# Patient Record
Sex: FEMALE | URBAN - METROPOLITAN AREA
[De-identification: names, ages, dates, MRNs, and addresses within clinical notes are randomized per-mention and may not be internally consistent; named-entity substitution may affect disease eponyms.]

---

## 2021-01-05 ENCOUNTER — APPOINTMENT (OUTPATIENT)
Age: 56
Setting detail: DERMATOLOGY
End: 2021-01-05

## 2021-01-05 DIAGNOSIS — L20.89 OTHER ATOPIC DERMATITIS: ICD-10-CM

## 2021-01-05 PROCEDURE — OTHER COUNSELING: OTHER

## 2021-01-05 PROCEDURE — OTHER TREATMENT REGIMEN: OTHER

## 2021-01-05 PROCEDURE — OTHER ADDITIONAL NOTES: OTHER

## 2021-01-05 PROCEDURE — 99203 OFFICE O/P NEW LOW 30 MIN: CPT

## 2021-01-05 PROCEDURE — OTHER PRESCRIPTION MEDICATION MANAGEMENT: OTHER

## 2021-01-05 ASSESSMENT — LOCATION ZONE DERM: LOCATION ZONE: TRUNK

## 2021-01-05 ASSESSMENT — LOCATION SIMPLE DESCRIPTION DERM: LOCATION SIMPLE: RIGHT BUTTOCK

## 2021-01-05 ASSESSMENT — LOCATION DETAILED DESCRIPTION DERM: LOCATION DETAILED: RIGHT BUTTOCK

## 2021-01-05 NOTE — PROCEDURE: COUNSELING
Patient Specific Counseling (Will Not Stick From Patient To Patient): \\nThere are a few 1-2 mm scabbed erosions.  Discussed can't completely r/o resolving HSV, but pt symptoms (no pain) and size of erosions (smaller than usual) are not typical of HSV.   Because it it scabbed over, it is too late to perform a culture.  I recommend tx for eczematous dermatitis.  If recurs in future and pt is in town, she can call to rtc and evaluate for possible culture.   Pt concerned about bacterial infection, but there was no sign of bacterial infection present.
Detail Level: Simple

## 2021-01-05 NOTE — HPI: SKIN LESION
Additional History: The patient states that this is not the first time that she has experienced something like this. She is concerned that it may be shingles.

## 2021-01-05 NOTE — PROCEDURE: PRESCRIPTION MEDICATION MANAGEMENT
Initiate Treatment: Ultravate 0.05% Lotion BID x2wks (sampled)
Render In Strict Bullet Format?: No
Detail Level: Simple

## 2021-01-05 NOTE — PROCEDURE: ADDITIONAL NOTES
Additional Notes: Recommended the patient follow up if the rash recurs so that we can evaluate and possibly do further testing to confirm the diagnosis.
Detail Level: Detailed
Render Risk Assessment In Note?: no